# Patient Record
Sex: MALE | Race: ASIAN | Employment: FULL TIME | URBAN - METROPOLITAN AREA
[De-identification: names, ages, dates, MRNs, and addresses within clinical notes are randomized per-mention and may not be internally consistent; named-entity substitution may affect disease eponyms.]

---

## 2018-05-30 ENCOUNTER — OFFICE VISIT (OUTPATIENT)
Dept: FAMILY MEDICINE CLINIC | Facility: CLINIC | Age: 33
End: 2018-05-30

## 2018-05-30 VITALS
HEART RATE: 80 BPM | SYSTOLIC BLOOD PRESSURE: 132 MMHG | WEIGHT: 212 LBS | DIASTOLIC BLOOD PRESSURE: 76 MMHG | BODY MASS INDEX: 30.35 KG/M2 | HEIGHT: 70 IN

## 2018-05-30 DIAGNOSIS — N48.1 BALANITIS: ICD-10-CM

## 2018-05-30 DIAGNOSIS — B08.1 MOLLUSCUM CONTAGIOSUM: ICD-10-CM

## 2018-05-30 DIAGNOSIS — K51.919 ULCERATIVE COLITIS WITH COMPLICATION, UNSPECIFIED LOCATION (HCC): ICD-10-CM

## 2018-05-30 DIAGNOSIS — Z00.00 ROUTINE PHYSICAL EXAMINATION: Primary | ICD-10-CM

## 2018-05-30 PROCEDURE — 99395 PREV VISIT EST AGE 18-39: CPT | Performed by: FAMILY MEDICINE

## 2018-05-30 RX ORDER — PODOFILOX 5 MG/ML
SOLUTION TOPICAL
Qty: 1 BOTTLE | Refills: 0 | Status: SHIPPED | OUTPATIENT
Start: 2018-05-30 | End: 2018-06-05

## 2018-05-30 NOTE — PROGRESS NOTES
Blood pressure 132/76, pulse 80, height 5' 10\" (1.778 m), weight 212 lb (96.2 kg). REASON FOR VISIT:    Jaclyn Curtis is a 29year old male who presents for an [address]         Patient Active Problem List:     Routine physical examination SPECIFIC DISEASE MONITORING Internal Lab or Procedure   No disease specific diagnoses    ALLERGIES:   No Known Allergies  CURRENT MEDICATIONS:     No current outpatient prescriptions on file.    MEDICAL INFORMATION:   Past Medical History:   Diagnosis hernia, PAPULAR UMBILICATED lesions ERYTHEMA ON SHAFT   RECTAL: deferred  MUSCULOSKELETAL: back is not tender, FROM of the back  EXTREMITIES: no cyanosis, clubbing or edema  NEURO: Oriented times three, cranial nerves are intact, motor and sensory are hipolito

## 2018-06-01 ENCOUNTER — APPOINTMENT (OUTPATIENT)
Dept: LAB | Age: 33
End: 2018-06-01
Attending: FAMILY MEDICINE
Payer: COMMERCIAL

## 2018-06-01 DIAGNOSIS — B08.1 MOLLUSCUM CONTAGIOSUM: ICD-10-CM

## 2018-06-01 DIAGNOSIS — Z00.00 ROUTINE PHYSICAL EXAMINATION: ICD-10-CM

## 2018-06-01 PROCEDURE — 80500 HEPATITIS A B + C PROFILE: CPT

## 2018-06-01 PROCEDURE — 86780 TREPONEMA PALLIDUM: CPT

## 2018-06-01 PROCEDURE — 80061 LIPID PANEL: CPT

## 2018-06-01 PROCEDURE — 86803 HEPATITIS C AB TEST: CPT

## 2018-06-01 PROCEDURE — 87340 HEPATITIS B SURFACE AG IA: CPT

## 2018-06-01 PROCEDURE — 86708 HEPATITIS A ANTIBODY: CPT

## 2018-06-01 PROCEDURE — 87389 HIV-1 AG W/HIV-1&-2 AB AG IA: CPT

## 2018-06-01 PROCEDURE — 82947 ASSAY GLUCOSE BLOOD QUANT: CPT

## 2018-06-01 PROCEDURE — 86706 HEP B SURFACE ANTIBODY: CPT

## 2018-06-01 PROCEDURE — 86704 HEP B CORE ANTIBODY TOTAL: CPT

## 2018-06-01 PROCEDURE — 87591 N.GONORRHOEAE DNA AMP PROB: CPT

## 2018-06-01 PROCEDURE — 87491 CHLMYD TRACH DNA AMP PROBE: CPT

## 2018-06-01 PROCEDURE — 36415 COLL VENOUS BLD VENIPUNCTURE: CPT

## 2018-06-02 ENCOUNTER — PATIENT MESSAGE (OUTPATIENT)
Dept: FAMILY MEDICINE CLINIC | Facility: CLINIC | Age: 33
End: 2018-06-02

## 2018-06-05 RX ORDER — PODOFILOX 5 MG/ML
SOLUTION TOPICAL
Qty: 1 BOTTLE | Refills: 0 | Status: SHIPPED | OUTPATIENT
Start: 2018-06-05 | End: 2019-05-02 | Stop reason: ALTCHOICE

## 2018-06-05 NOTE — TELEPHONE ENCOUNTER
From: Alcira Medina  To: Radha Norton DO  Sent: 6/2/2018 9:47 PM CDT  Subject: Prescription Question    Hi. Can my prescription for podofolix be sent to the [facility] in [facility] on [address]? Their phone number is [phone].     Than

## 2018-06-05 NOTE — TELEPHONE ENCOUNTER
Called [facility] and cancelled prescription for Podofilox Soln. Re-sent to [facility]. E-mail sent to patient.

## 2018-10-19 ENCOUNTER — OFFICE VISIT (OUTPATIENT)
Dept: FAMILY MEDICINE CLINIC | Facility: CLINIC | Age: 33
End: 2018-10-19
Payer: COMMERCIAL

## 2018-10-19 VITALS
HEIGHT: 70 IN | BODY MASS INDEX: 31.78 KG/M2 | DIASTOLIC BLOOD PRESSURE: 96 MMHG | WEIGHT: 222 LBS | HEART RATE: 130 BPM | SYSTOLIC BLOOD PRESSURE: 138 MMHG

## 2018-10-19 DIAGNOSIS — R06.83 SNORING: ICD-10-CM

## 2018-10-19 DIAGNOSIS — G47.33 OSA (OBSTRUCTIVE SLEEP APNEA): Primary | ICD-10-CM

## 2018-10-19 PROCEDURE — 99213 OFFICE O/P EST LOW 20 MIN: CPT | Performed by: FAMILY MEDICINE

## 2018-10-19 PROCEDURE — 99212 OFFICE O/P EST SF 10 MIN: CPT | Performed by: FAMILY MEDICINE

## 2018-10-19 NOTE — PROGRESS NOTES
Blood pressure (!) 138/96, pulse (!) 130, height 5' 10\" (1.778 m), weight 222 lb (100.7 kg). PATIENT PRESENT TODAY CONCERNED ABOUT SLEEP APNEA. Was told that he snores very loudly. Girlfriend told him he stops breathing while he sleeps.   He does admi

## 2018-10-19 NOTE — PATIENT INSTRUCTIONS
What Are Snoring and Obstructive Sleep Apnea? If Chikis Ly ever had a stuffed-up nose, you know the feeling of trying to breathe through a very narrow passageway. This is what happens in your throat when you snore.  While you sleep, structures in your throa Problems in the structure of the nose may block breathing. A crooked (deviated) septum or swollen turbinates can make snoring worse or lead to apnea. Also, a receding jaw may make the tongue sit too far back.  Then it’s more likely to block the airway when · If you smoke, talk to your healthcare provider about ways to quit. · Sleep on your side. This can help prevent gravity from pulling relaxed throat tissues into your breathing passages.   · If you have allergies or sinus problems that block your nose, ask

## 2018-11-09 ENCOUNTER — TELEPHONE (OUTPATIENT)
Dept: FAMILY MEDICINE CLINIC | Facility: CLINIC | Age: 33
End: 2018-11-09

## 2018-11-09 ENCOUNTER — OFFICE VISIT (OUTPATIENT)
Dept: OTOLARYNGOLOGY | Facility: CLINIC | Age: 33
End: 2018-11-09
Payer: COMMERCIAL

## 2018-11-09 VITALS
HEIGHT: 70 IN | DIASTOLIC BLOOD PRESSURE: 87 MMHG | TEMPERATURE: 99 F | WEIGHT: 220 LBS | BODY MASS INDEX: 31.5 KG/M2 | SYSTOLIC BLOOD PRESSURE: 138 MMHG

## 2018-11-09 DIAGNOSIS — G47.33 OBSTRUCTIVE SLEEP APNEA SYNDROME: Primary | ICD-10-CM

## 2018-11-09 DIAGNOSIS — G47.33 OSA (OBSTRUCTIVE SLEEP APNEA): Primary | ICD-10-CM

## 2018-11-09 PROCEDURE — 99243 OFF/OP CNSLTJ NEW/EST LOW 30: CPT | Performed by: OTOLARYNGOLOGY

## 2018-11-09 PROCEDURE — 99212 OFFICE O/P EST SF 10 MIN: CPT | Performed by: OTOLARYNGOLOGY

## 2018-11-09 NOTE — PROGRESS NOTES
Mukesh Martinez is a 34year old male. Patient presents with:  Snoring    HPI:   He has been having very loud snoring for many years. He has been told that his snoring is getting worse. He feels that he is sleeping very poorly.     Current Outpatient Medica Normal.    Ears Normal Inspection - Right: Normal, Left: Normal. Canal - Left: Normal. TM - Right: Normal, Left: Normal.     ASSESSMENT AND PLAN:   1.  Obstructive sleep apnea syndrome  He has a nasal septal deviation and very large tonsils causing nasal an

## 2018-11-12 ENCOUNTER — TELEPHONE (OUTPATIENT)
Dept: OTOLARYNGOLOGY | Facility: CLINIC | Age: 33
End: 2018-11-12

## 2018-11-12 NOTE — TELEPHONE ENCOUNTER
Authorization is pending for pt. Pending [id number]. Faxed over notes to [facility] today.

## 2018-11-13 ENCOUNTER — TELEPHONE (OUTPATIENT)
Dept: OTOLARYNGOLOGY | Facility: CLINIC | Age: 33
End: 2018-11-13

## 2018-11-13 NOTE — TELEPHONE ENCOUNTER
Patient dropped of a form from his insurance stating that it needed to be filled out to show when he had his physical done.   Form was filled out and message was left on patient's voicemail that the form will be at the  for him to .    Patient states he sent a message through [employer] to , was made aware out of office until 18. Would like to see if RN call call him back regarding his [employer] message. Please call. Thank you.

## 2018-11-13 NOTE — TELEPHONE ENCOUNTER
Called pt's insurance, [phone], spoke with Phillip, case for diagnostic sleep study is still pending clinical review.

## 2018-11-14 NOTE — TELEPHONE ENCOUNTER
[facility] called requesting any office visit notes be sent to fax # [phone].

## 2018-11-28 ENCOUNTER — TELEPHONE (OUTPATIENT)
Dept: OTOLARYNGOLOGY | Facility: CLINIC | Age: 33
End: 2018-11-28

## 2018-11-28 NOTE — TELEPHONE ENCOUNTER
MARC regarding approved prior authorization for sleep study with authorization [id number]  Valid from November 14,2018 until February 10,2019, please advise pt to call his insurance to check his out of the pocket expenses and co pays.

## 2018-11-29 ENCOUNTER — TELEPHONE (OUTPATIENT)
Dept: OTOLARYNGOLOGY | Facility: CLINIC | Age: 33
End: 2018-11-29

## 2019-01-10 ENCOUNTER — TELEPHONE (OUTPATIENT)
Dept: OTOLARYNGOLOGY | Facility: CLINIC | Age: 34
End: 2019-01-10

## 2019-01-10 RX ORDER — AMOXICILLIN AND CLAVULANATE POTASSIUM 250; 62.5 MG/5ML; MG/5ML
15 POWDER, FOR SUSPENSION ORAL 2 TIMES DAILY
Qty: 210 ML | Refills: 0 | Status: SHIPPED | OUTPATIENT
Start: 2019-01-10 | End: 2019-01-17

## 2019-01-10 RX ORDER — PREDNISOLONE 15 MG/5 ML
15 SOLUTION, ORAL ORAL 2 TIMES DAILY
Qty: 70 ML | Refills: 0 | Status: SHIPPED | OUTPATIENT
Start: 2019-01-10 | End: 2019-01-17

## 2019-01-11 ENCOUNTER — TELEPHONE (OUTPATIENT)
Dept: OTOLARYNGOLOGY | Facility: CLINIC | Age: 34
End: 2019-01-11

## 2019-01-11 NOTE — TELEPHONE ENCOUNTER
Pt is post op day 1 tonsillectomy/septoplasty/smr. Per pt  is doing ok, pt c/o of pain , drinking plenty of fluids, no bleeding or fever.  Advised pt pain can worsen post op and radiate to jaw, ears, and is not abnormal, pt is taking pain medication,antibio

## 2019-01-16 ENCOUNTER — TELEPHONE (OUTPATIENT)
Dept: OTOLARYNGOLOGY | Facility: CLINIC | Age: 34
End: 2019-01-16

## 2019-01-16 NOTE — TELEPHONE ENCOUNTER
Per pt no bleeding or fever, per pt having a lot of throat pain. Advised pt that with tonsillectomy, pain tends to worsen post op days 4-7 and is expected.  Pt advised to push fluids, Dr. Olga Marie informed and advised no other recommendations or medications w

## 2019-01-16 NOTE — TELEPHONE ENCOUNTER
Pt. States that he just had surgery last week and is concerned about having alot pain in his throat. I transferred call to RN.

## 2019-01-17 ENCOUNTER — TELEPHONE (OUTPATIENT)
Dept: ADMINISTRATIVE | Age: 34
End: 2019-01-17

## 2019-01-17 NOTE — TELEPHONE ENCOUNTER
Pt will have FMLA forms faxed to forms dept. Pt to fill out HIPAA release and pay $25 at time of visit on 1/18/19.

## 2019-01-18 ENCOUNTER — OFFICE VISIT (OUTPATIENT)
Dept: OTOLARYNGOLOGY | Facility: CLINIC | Age: 34
End: 2019-01-18
Payer: COMMERCIAL

## 2019-01-18 VITALS
SYSTOLIC BLOOD PRESSURE: 124 MMHG | DIASTOLIC BLOOD PRESSURE: 90 MMHG | BODY MASS INDEX: 31.5 KG/M2 | WEIGHT: 220 LBS | TEMPERATURE: 97 F | HEIGHT: 70 IN

## 2019-01-18 DIAGNOSIS — G47.33 OBSTRUCTIVE SLEEP APNEA SYNDROME: Primary | ICD-10-CM

## 2019-01-18 PROCEDURE — 99212 OFFICE O/P EST SF 10 MIN: CPT | Performed by: OTOLARYNGOLOGY

## 2019-01-18 PROCEDURE — 99024 POSTOP FOLLOW-UP VISIT: CPT | Performed by: OTOLARYNGOLOGY

## 2019-01-18 NOTE — TELEPHONE ENCOUNTER
Pt here for Post op appt with     PT given HIPAA release and FCR forms to fill out and complete    Pt paid $25 fee    Pt asking if we have received FMLA forms from his employer yet    Pt will email them to us now/ email received and printed out    Pt wi

## 2019-01-18 NOTE — PROGRESS NOTES
He is still very sore following his septoplasty and tonsillectomy. Exam:  Nasal splints removed. Airway patent bilaterally. Pharynx is healing well    Assessment/plan:  He actually seems to be doing very well following his surgery.   I recommended that

## 2019-01-19 NOTE — TELEPHONE ENCOUNTER
FMLA and Disability forms for Dr. Zully Sosa received in [employer] Signed release, paid $25 w/ . Logged for processing. Need to inform pt of addl $25 charge (2 forms).  NK

## 2019-02-01 NOTE — TELEPHONE ENCOUNTER
Dr. Marely Flores,  2 forms: FMLA and Disab - Pt off from 1/10 and RTW on 1/23/19 due to procedure. Please sign off on form:  -Highlight the patient and hit \"Chart\" button.   -In Chart Review, w/in the Encounter tab - click 1 time on the Telephone call encount

## 2019-02-06 ENCOUNTER — OFFICE VISIT (OUTPATIENT)
Dept: FAMILY MEDICINE CLINIC | Facility: CLINIC | Age: 34
End: 2019-02-06
Payer: COMMERCIAL

## 2019-02-06 VITALS
SYSTOLIC BLOOD PRESSURE: 124 MMHG | HEART RATE: 111 BPM | HEIGHT: 70 IN | BODY MASS INDEX: 31.41 KG/M2 | WEIGHT: 219.38 LBS | DIASTOLIC BLOOD PRESSURE: 85 MMHG

## 2019-02-06 DIAGNOSIS — L02.512 ABSCESS OF FINGER OF LEFT HAND: Primary | ICD-10-CM

## 2019-02-06 PROCEDURE — 99212 OFFICE O/P EST SF 10 MIN: CPT | Performed by: FAMILY MEDICINE

## 2019-02-06 PROCEDURE — 90471 IMMUNIZATION ADMIN: CPT | Performed by: FAMILY MEDICINE

## 2019-02-06 PROCEDURE — 90715 TDAP VACCINE 7 YRS/> IM: CPT | Performed by: FAMILY MEDICINE

## 2019-02-06 PROCEDURE — 99213 OFFICE O/P EST LOW 20 MIN: CPT | Performed by: FAMILY MEDICINE

## 2019-02-06 RX ORDER — CEPHALEXIN 500 MG/1
500 TABLET ORAL 4 TIMES DAILY
Qty: 40 TABLET | Refills: 0 | Status: SHIPPED | OUTPATIENT
Start: 2019-02-06 | End: 2019-05-02 | Stop reason: ALTCHOICE

## 2019-02-06 NOTE — PROGRESS NOTES
Blood pressure 124/85, pulse 111, height 5' 10\" (1.778 m), weight 219 lb 6 oz (99.5 kg). Patient presents today complaining of persistent left index finger pain at the tip after pressing into a tack at work.   He reports it is not healed and is painful

## 2019-02-11 ENCOUNTER — TELEPHONE (OUTPATIENT)
Dept: FAMILY MEDICINE CLINIC | Facility: CLINIC | Age: 34
End: 2019-02-11

## 2019-02-11 NOTE — TELEPHONE ENCOUNTER
----- Message from Michelle Jiménez DO sent at 2/8/2019 12:26 PM CST -----  No growth from finger culture so far. Please advise patient to continue to place neosporin on finger and change dressing daily.

## 2019-02-13 ENCOUNTER — OFFICE VISIT (OUTPATIENT)
Dept: FAMILY MEDICINE CLINIC | Facility: CLINIC | Age: 34
End: 2019-02-13
Payer: COMMERCIAL

## 2019-02-13 VITALS
WEIGHT: 218 LBS | BODY MASS INDEX: 31.21 KG/M2 | SYSTOLIC BLOOD PRESSURE: 122 MMHG | DIASTOLIC BLOOD PRESSURE: 84 MMHG | HEIGHT: 70 IN | HEART RATE: 116 BPM

## 2019-02-13 DIAGNOSIS — L02.512 ABSCESS OF FINGER OF LEFT HAND: Primary | ICD-10-CM

## 2019-02-13 PROCEDURE — 99213 OFFICE O/P EST LOW 20 MIN: CPT | Performed by: FAMILY MEDICINE

## 2019-02-13 PROCEDURE — 99212 OFFICE O/P EST SF 10 MIN: CPT | Performed by: FAMILY MEDICINE

## 2019-02-13 NOTE — PROGRESS NOTES
Blood pressure 122/84, pulse 116, height 5' 10\" (1.778 m), weight 218 lb (98.9 kg). Following up for left index finger trauma. Reports that he has noticed decreased pain. No systemic symptoms. Taking cephalexin and using Neosporin on the finger.

## 2019-03-19 ENCOUNTER — OFFICE VISIT (OUTPATIENT)
Dept: FAMILY MEDICINE CLINIC | Facility: CLINIC | Age: 34
End: 2019-03-19
Payer: COMMERCIAL

## 2019-03-19 VITALS
HEIGHT: 70 IN | DIASTOLIC BLOOD PRESSURE: 86 MMHG | SYSTOLIC BLOOD PRESSURE: 125 MMHG | BODY MASS INDEX: 32.21 KG/M2 | WEIGHT: 225 LBS | HEART RATE: 99 BPM

## 2019-03-19 DIAGNOSIS — S69.92XD INJURY OF LEFT INDEX FINGER, SUBSEQUENT ENCOUNTER: Primary | ICD-10-CM

## 2019-03-19 PROCEDURE — 99213 OFFICE O/P EST LOW 20 MIN: CPT | Performed by: FAMILY MEDICINE

## 2019-03-19 PROCEDURE — 99212 OFFICE O/P EST SF 10 MIN: CPT | Performed by: FAMILY MEDICINE

## 2019-03-19 NOTE — PROGRESS NOTES
Blood pressure 125/86, pulse 99, height 5' 10\" (1.778 m), weight 225 lb (102.1 kg). Continuous pain of left fingertip of the index. Reports that he does not have pain further down only in the tip of his finger. 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   He is

## 2019-04-01 ENCOUNTER — OFFICE VISIT (OUTPATIENT)
Dept: SURGERY | Facility: CLINIC | Age: 34
End: 2019-04-01
Payer: COMMERCIAL

## 2019-04-01 DIAGNOSIS — L72.0 INCLUSION CYST: ICD-10-CM

## 2019-04-01 DIAGNOSIS — S61.201A UNSPECIFIED OPEN WOUND OF LEFT INDEX FINGER WITHOUT DAMAGE TO NAIL, INITIAL ENCOUNTER: Primary | ICD-10-CM

## 2019-04-01 PROCEDURE — 99243 OFF/OP CNSLTJ NEW/EST LOW 30: CPT | Performed by: PLASTIC SURGERY

## 2019-04-01 PROCEDURE — 99212 OFFICE O/P EST SF 10 MIN: CPT | Performed by: PLASTIC SURGERY

## 2019-04-01 NOTE — PROGRESS NOTES
Per Dr. Patrick Hartman' evaluation, applied silvadene and a Band-Aid to LIF and pt will follow-up in one month. Verbalized understanding.

## 2019-04-01 NOTE — H&P
Shine Cerda is a 34year old male that presents with Patient presents with: Infection: LIF  .     REFERRED BY:  Daniella Rodriguez      Pacemaker: No  Latex Allergy: no  Coumadin: No  Plavix: No  Other anticoagulants: No  Cardiac stents: No    HAND [facility] Medications:  Cephalexin 500 MG Oral Tab Take 500 mg by mouth 4 (four) times daily. Disp: 40 tablet Rfl: 0   HYDROcodone-acetaminophen 7.5-325 MG/15ML Oral Solution Take 15 mL by mouth every 6 (six) hours as needed for Pain.  Disp: 473 mL Rfl: 0   Podofilox file        Physically abused: Not on file        Forced sexual activity: Not on file    Other Topics      Concerns:         Service: Not Asked        Blood Transfusions: Not Asked        Caffeine Concern: Not Asked          soda, biweekly        O cool    Excellent blanching and capillary refill    Wrist Rai: Radial 3 seconds, ulnar 3 seconds    Digital Ari: RDA 5, UDA 5 seconds      Left buttock 1 cm inclusion cyst noninfected      ASSESSMENT/PLAN:       WOUND(S) LIF, traumatic, nonhealing  Per

## 2019-04-19 ENCOUNTER — OFFICE VISIT (OUTPATIENT)
Dept: OTOLARYNGOLOGY | Facility: CLINIC | Age: 34
End: 2019-04-19
Payer: COMMERCIAL

## 2019-04-19 VITALS — HEART RATE: 94 BPM | SYSTOLIC BLOOD PRESSURE: 127 MMHG | DIASTOLIC BLOOD PRESSURE: 86 MMHG

## 2019-04-19 DIAGNOSIS — G47.33 OBSTRUCTIVE SLEEP APNEA SYNDROME: Primary | ICD-10-CM

## 2019-04-19 PROCEDURE — 99213 OFFICE O/P EST LOW 20 MIN: CPT | Performed by: OTOLARYNGOLOGY

## 2019-04-19 PROCEDURE — 99212 OFFICE O/P EST SF 10 MIN: CPT | Performed by: OTOLARYNGOLOGY

## 2019-04-19 NOTE — PROGRESS NOTES
Karen Jain is a 34year old male. Patient presents with:  Post-Op: Patient present for surgical follow up - more mucus since surgery    HPI:   Feels that has been doing really well following his surgery. He is sleeping much better.   He has a little bit Psychiatric Normal Orientation - Oriented to time, place, person & situation. Appropriate mood and affect. Lymph Detail Normal Submental. Submandibular. Anterior cervical. Posterior cervical. Supraclavicular.    Eyes Normal Conjunctiva - Right: Normal,

## 2019-05-02 ENCOUNTER — OFFICE VISIT (OUTPATIENT)
Dept: SURGERY | Facility: CLINIC | Age: 34
End: 2019-05-02
Payer: COMMERCIAL

## 2019-05-02 DIAGNOSIS — S61.201A UNSPECIFIED OPEN WOUND OF LEFT INDEX FINGER WITHOUT DAMAGE TO NAIL, INITIAL ENCOUNTER: Primary | ICD-10-CM

## 2019-05-02 PROCEDURE — 99212 OFFICE O/P EST SF 10 MIN: CPT | Performed by: PLASTIC SURGERY

## 2019-05-02 NOTE — PROGRESS NOTES
Injury 1: LIF wound - tack  - Date: 01/08/19  - Days Since: 114    LIF open to air   Pt has been washing, applying silvadene, and band aid twice a day  Distal tip of LIF has small superficial yellow to brown colored scab  No drainage   Periwound area with

## 2019-06-20 ENCOUNTER — TELEPHONE (OUTPATIENT)
Dept: FAMILY MEDICINE CLINIC | Facility: CLINIC | Age: 34
End: 2019-06-20

## 2019-06-20 ENCOUNTER — OFFICE VISIT (OUTPATIENT)
Dept: SURGERY | Facility: CLINIC | Age: 34
End: 2019-06-20
Payer: COMMERCIAL

## 2019-06-20 DIAGNOSIS — S61.201A UNSPECIFIED OPEN WOUND OF LEFT INDEX FINGER WITHOUT DAMAGE TO NAIL, INITIAL ENCOUNTER: Primary | ICD-10-CM

## 2019-06-20 DIAGNOSIS — M79.643 PAIN OF HAND, UNSPECIFIED LATERALITY: ICD-10-CM

## 2019-06-20 PROCEDURE — 99213 OFFICE O/P EST LOW 20 MIN: CPT | Performed by: PLASTIC SURGERY

## 2019-06-20 PROCEDURE — 99212 OFFICE O/P EST SF 10 MIN: CPT | Performed by: PLASTIC SURGERY

## 2019-06-20 NOTE — PROGRESS NOTES
Injury 1: LIF wound - tack  - Date: 01/08/19  - Days Since: 163      Pt here for FU of LIF wound   Denies pain, numbness and tingling to LIF  LIF distal tip has minimal scabbing to site and is healing, approximated, no drainage or s/s of infection   Pt is

## 2019-06-20 NOTE — PROGRESS NOTES
Per verbal  order from Dr Sejal Horner, pt  given verbal instructions with demonstration of Eucerin massage to incision of LIF  Given \"After Skin Surgery\" Pamphlet. Discussed sun exposure, sun block usage and scars sensitivity to the sun.   Questions answe

## 2021-04-08 ENCOUNTER — HOSPITAL ENCOUNTER (INPATIENT)
Facility: HOSPITAL | Age: 36
LOS: 2 days | Discharge: HOME | DRG: 177 | End: 2021-04-10
Attending: EMERGENCY MEDICINE | Admitting: HOSPITALIST
Payer: COMMERCIAL

## 2021-04-08 ENCOUNTER — APPOINTMENT (OUTPATIENT)
Dept: GENERAL RADIOLOGY | Facility: HOSPITAL | Age: 36
DRG: 177 | End: 2021-04-08
Payer: COMMERCIAL

## 2021-04-08 ENCOUNTER — APPOINTMENT (OUTPATIENT)
Dept: CT IMAGING | Facility: HOSPITAL | Age: 36
DRG: 177 | End: 2021-04-08
Attending: HOSPITALIST
Payer: COMMERCIAL

## 2021-04-08 DIAGNOSIS — J12.82 PNEUMONIA DUE TO COVID-19 VIRUS: Primary | ICD-10-CM

## 2021-04-08 DIAGNOSIS — R09.02 HYPOXIA: ICD-10-CM

## 2021-04-08 DIAGNOSIS — U07.1 PNEUMONIA DUE TO COVID-19 VIRUS: Primary | ICD-10-CM

## 2021-04-08 PROCEDURE — 86140 C-REACTIVE PROTEIN: CPT | Performed by: EMERGENCY MEDICINE

## 2021-04-08 PROCEDURE — 99285 EMERGENCY DEPT VISIT HI MDM: CPT

## 2021-04-08 PROCEDURE — 84484 ASSAY OF TROPONIN QUANT: CPT | Performed by: EMERGENCY MEDICINE

## 2021-04-08 PROCEDURE — 93010 ELECTROCARDIOGRAM REPORT: CPT

## 2021-04-08 PROCEDURE — 82550 ASSAY OF CK (CPK): CPT | Performed by: HOSPITALIST

## 2021-04-08 PROCEDURE — 96365 THER/PROPH/DIAG IV INF INIT: CPT

## 2021-04-08 PROCEDURE — 71045 X-RAY EXAM CHEST 1 VIEW: CPT | Performed by: EMERGENCY MEDICINE

## 2021-04-08 PROCEDURE — XW033E5 INTRODUCTION OF REMDESIVIR ANTI-INFECTIVE INTO PERIPHERAL VEIN, PERCUTANEOUS APPROACH, NEW TECHNOLOGY GROUP 5: ICD-10-PCS | Performed by: HOSPITALIST

## 2021-04-08 PROCEDURE — 80053 COMPREHEN METABOLIC PANEL: CPT | Performed by: EMERGENCY MEDICINE

## 2021-04-08 PROCEDURE — 85379 FIBRIN DEGRADATION QUANT: CPT | Performed by: EMERGENCY MEDICINE

## 2021-04-08 PROCEDURE — 84145 PROCALCITONIN (PCT): CPT | Performed by: HOSPITALIST

## 2021-04-08 PROCEDURE — 3E0333Z INTRODUCTION OF ANTI-INFLAMMATORY INTO PERIPHERAL VEIN, PERCUTANEOUS APPROACH: ICD-10-PCS | Performed by: HOSPITALIST

## 2021-04-08 PROCEDURE — 82728 ASSAY OF FERRITIN: CPT | Performed by: HOSPITALIST

## 2021-04-08 PROCEDURE — 84145 PROCALCITONIN (PCT): CPT | Performed by: EMERGENCY MEDICINE

## 2021-04-08 PROCEDURE — 82550 ASSAY OF CK (CPK): CPT | Performed by: EMERGENCY MEDICINE

## 2021-04-08 PROCEDURE — 83615 LACTATE (LD) (LDH) ENZYME: CPT | Performed by: HOSPITALIST

## 2021-04-08 PROCEDURE — 85379 FIBRIN DEGRADATION QUANT: CPT | Performed by: HOSPITALIST

## 2021-04-08 PROCEDURE — 82728 ASSAY OF FERRITIN: CPT | Performed by: EMERGENCY MEDICINE

## 2021-04-08 PROCEDURE — 80053 COMPREHEN METABOLIC PANEL: CPT | Performed by: INTERNAL MEDICINE

## 2021-04-08 PROCEDURE — 83615 LACTATE (LD) (LDH) ENZYME: CPT | Performed by: EMERGENCY MEDICINE

## 2021-04-08 PROCEDURE — 71275 CT ANGIOGRAPHY CHEST: CPT | Performed by: HOSPITALIST

## 2021-04-08 PROCEDURE — 87040 BLOOD CULTURE FOR BACTERIA: CPT | Performed by: EMERGENCY MEDICINE

## 2021-04-08 PROCEDURE — 93005 ELECTROCARDIOGRAM TRACING: CPT

## 2021-04-08 PROCEDURE — 83880 ASSAY OF NATRIURETIC PEPTIDE: CPT | Performed by: EMERGENCY MEDICINE

## 2021-04-08 PROCEDURE — 85025 COMPLETE CBC W/AUTO DIFF WBC: CPT | Performed by: HOSPITALIST

## 2021-04-08 PROCEDURE — 86140 C-REACTIVE PROTEIN: CPT | Performed by: HOSPITALIST

## 2021-04-08 PROCEDURE — 36415 COLL VENOUS BLD VENIPUNCTURE: CPT

## 2021-04-08 PROCEDURE — 85025 COMPLETE CBC W/AUTO DIFF WBC: CPT | Performed by: EMERGENCY MEDICINE

## 2021-04-08 RX ORDER — BISACODYL 10 MG
10 SUPPOSITORY, RECTAL RECTAL
Status: DISCONTINUED | OUTPATIENT
Start: 2021-04-08 | End: 2021-04-10

## 2021-04-08 RX ORDER — ACETAMINOPHEN 500 MG
1000 TABLET ORAL ONCE
Status: DISCONTINUED | OUTPATIENT
Start: 2021-04-08 | End: 2021-04-08

## 2021-04-08 RX ORDER — ACETAMINOPHEN 500 MG
500 TABLET ORAL EVERY 6 HOURS PRN
COMMUNITY
End: 2021-08-24 | Stop reason: ALTCHOICE

## 2021-04-08 RX ORDER — BENZONATATE 100 MG/1
100 CAPSULE ORAL 3 TIMES DAILY PRN
Status: DISCONTINUED | OUTPATIENT
Start: 2021-04-08 | End: 2021-04-10

## 2021-04-08 RX ORDER — ACETAMINOPHEN 500 MG
1000 TABLET ORAL ONCE
Status: COMPLETED | OUTPATIENT
Start: 2021-04-08 | End: 2021-04-08

## 2021-04-08 RX ORDER — POLYETHYLENE GLYCOL 3350 17 G/17G
17 POWDER, FOR SOLUTION ORAL DAILY PRN
Status: DISCONTINUED | OUTPATIENT
Start: 2021-04-08 | End: 2021-04-10

## 2021-04-08 RX ORDER — ONDANSETRON 2 MG/ML
4 INJECTION INTRAMUSCULAR; INTRAVENOUS EVERY 6 HOURS PRN
Status: DISCONTINUED | OUTPATIENT
Start: 2021-04-08 | End: 2021-04-10

## 2021-04-08 RX ORDER — CODEINE PHOSPHATE AND GUAIFENESIN 10; 100 MG/5ML; MG/5ML
5 SOLUTION ORAL EVERY 6 HOURS PRN
Status: DISCONTINUED | OUTPATIENT
Start: 2021-04-08 | End: 2021-04-10

## 2021-04-08 RX ORDER — DIPHENHYDRAMINE HCL 25 MG
25 CAPSULE ORAL EVERY 6 HOURS PRN
COMMUNITY
End: 2021-08-24 | Stop reason: ALTCHOICE

## 2021-04-08 RX ORDER — ENOXAPARIN SODIUM 100 MG/ML
40 INJECTION SUBCUTANEOUS DAILY
Status: DISCONTINUED | OUTPATIENT
Start: 2021-04-08 | End: 2021-04-10

## 2021-04-08 RX ORDER — ACETAMINOPHEN 325 MG/1
650 TABLET ORAL EVERY 6 HOURS PRN
Status: DISCONTINUED | OUTPATIENT
Start: 2021-04-08 | End: 2021-04-10

## 2021-04-08 RX ORDER — DEXAMETHASONE SODIUM PHOSPHATE 4 MG/ML
6 VIAL (ML) INJECTION EVERY 24 HOURS
Status: DISCONTINUED | OUTPATIENT
Start: 2021-04-09 | End: 2021-04-10

## 2021-04-08 RX ORDER — DEXAMETHASONE 4 MG/1
6 TABLET ORAL ONCE
Status: COMPLETED | OUTPATIENT
Start: 2021-04-08 | End: 2021-04-08

## 2021-04-08 RX ORDER — GUAIFENESIN 100 MG/5ML
100 SOLUTION ORAL EVERY 4 HOURS PRN
COMMUNITY
End: 2021-08-24 | Stop reason: ALTCHOICE

## 2021-04-08 RX ORDER — GUAIFENESIN 600 MG
600 TABLET, EXTENDED RELEASE 12 HR ORAL 2 TIMES DAILY
COMMUNITY
End: 2021-08-24 | Stop reason: ALTCHOICE

## 2021-04-08 RX ORDER — MELATONIN
3 NIGHTLY PRN
Status: DISCONTINUED | OUTPATIENT
Start: 2021-04-08 | End: 2021-04-10

## 2021-04-08 NOTE — PLAN OF CARE
Problem: Patient/Family Goals  Goal: Patient/Family Long Term Goal  Description: Patient's Long Term Goal:    Interventions:  -   - See additional Care Plan goals for specific interventions  Outcome: Progressing  Goal: Patient/Family Short Term Goal  Tarik consult as needed  - Establish a toileting routine/schedule  - Consider collaborating with pharmacy to review patient's medication profile  Outcome: Progressing     Problem: RISK FOR INFECTION - ADULT  Goal: Absence of fever/infection during anticipated ne

## 2021-04-08 NOTE — CONSULTS
Pulmonary Consult     Assessment / Plan:  1. Acute hypoxemic respiratory failure  - Due to COVID-19 pneumonia   - on vapotherm 40L/70%  - tx as discussed below   - IS use  - had an extensive conversation re: awake prone    2.  COVID-19 pneumonia   - Sx 3/29 Tablet 12 Hr, Take 600 mg by mouth 2 (two) times daily. , Disp: , Rfl: , Taking      guaiFENesin 100 MG/5ML Oral Solution, Take 100 mg by mouth every 4 (four) hours as needed. , Disp: , Rfl:   diphenhydrAMINE 25 MG Oral Cap, Take 25 mg by mouth every 6 (six) EMR    Imaging:   Chest imaging reviewed

## 2021-04-08 NOTE — CONSULTS
INFECTIOUS DISEASE [address] [facility] [address] Patient Status:  Inpatient    10/10/1985 MRN [id number]   [facility] 5NW-A Attending Jonathan Fong MD   Hosp Day # 0 PCP Estefani Tobias Oral, Daily PRN  •  bisacodyl (DULCOLAX) rectal suppository 10 mg, 10 mg, Rectal, Daily PRN  •  [START ON 4/9/2021] remdesivir 100 mg in sodium chloride 0.9% 270 mL IVPB, 100 mg, Intravenous, Q24H  •  guaiFENesin (ROBITUSSIN) 100mg/5ml LIQUID 100 mg, 100 m lesions.  No erythema, no open wounds      Laboratory Data:  Laboratory data reviewed      Recent Labs   Lab 04/08/21  0552   RBC 4.89   HGB 14.6   HCT 43.7   MCV 89.4   MCH 29.9   MCHC 33.4   RDW 12.3   NEPRELIM 3.44   WBC 4.8   .0       Recent Labs day  Prophylactic anticoagulation        Wilmar Ramirez MD  [facility]  [phone]

## 2021-04-08 NOTE — ED INITIAL ASSESSMENT (HPI)
Patient here with SOB that started tonight. Patient tested positive for Covid on 4/4/21, symptoms started on 3/31/21. Patient with SPO2 85% on RA upon arrival to A6. Patient placed on 2L O2 via nasal cannula with improved reading 95%.

## 2021-04-08 NOTE — PAYOR COMM NOTE
Appropriate for inpatient status per guidelines for SOB and chest pain due to COVID pneumonia with hypoxia.       --------------  ADMISSION REVIEW     Payor: [address] [place] [address] #:  [id number]  Authorization Number: [id number] membranes are moist.   Eyes:      Extraocular Movements: Extraocular movements intact. Conjunctiva/sclera: Conjunctivae normal.      Comments: Pupils equal, anicteric   Cardiovascular:      Rate and Rhythm: Normal rate and regular rhythm.       Pulses: All other components within normal limits    Narrative:     Resulted by: batch: K, CRP, CK, FERR, LDH, CO2, CL, NA, ALB, TBIL, ALT, AST, ALP, CA, CREA, BUN, GLUC,           Resulted by: [id number]: PBNP,    CK CREATINE KINASE (NOT CREATININE) - Abnormal; Notab components within normal limits   PRO BETA NATRIURETIC PEPTIDE - Normal   TROPONIN I - Normal   CBC WITH DIFFERENTIAL WITH PLATELET    Narrative:        EKG    Rate, intervals and axes as noted on EKG Report.   Rate: 126  Rhythm: sinus tach  Reading: incomp Clinical Impression:  Pneumonia due to COVID-19 virus  (primary encounter diagnosis)  Hypoxia     Disposition:  Admit  4/8/2021  3:51 am                H&P Note    CC: Patient presents with:  Difficulty Breathing        PCP: HENNA Mahajan     Hist day  Prophylactic anticoagulation       ASSESSMENT / PLAN:   36 y/o m w hx of carol, UC p/w sob     Acute hypoxic resp failure d/t covid   -+test 4/4  -40L high flow  -monitor lfts on remdesevir  -decardon  -pulm c/s for actemera  -trend inflammatory markers

## 2021-04-08 NOTE — ED PROVIDER NOTES
Patient Seen in: [facility] Emergency Department      History   Patient presents with:  Difficulty Breathing    Stated Complaint: covid +, holly    HPI/Subjective:   HPI    35yr old M hx ulcerative colitis-not on immunosuppressive meds, here with covi Extraocular Movements: Extraocular movements intact. Conjunctiva/sclera: Conjunctivae normal.      Comments: Pupils equal, anicteric   Cardiovascular:      Rate and Rhythm: Normal rate and regular rhythm. Pulses: Normal pulses.       Heart so normal limits    Narrative:     Resulted by: batch: K, CRP, CK, FERR, LDH, CO2, CL, NA, ALB, TBIL, ALT, AST, ALP, CA, CREA, BUN, GLUC,           Resulted by: [id number]: PBNP,    CK CREATINE KINASE (NOT CREATININE) - Abnormal; Notable for the following compone BETA NATRIURETIC PEPTIDE - Normal   TROPONIN I - Normal   CBC WITH DIFFERENTIAL WITH PLATELET    Narrative: The following orders were created for panel order CBC WITH DIFFERENTIAL WITH PLATELET.   Procedure                               Abnormality 4/8/2021  3:51 AM         Critical Care Statement:   Upon my evaluation, this patient had a high probability of imminent or life-threatening deterioration which required my direct attention, intervention and personal management.  Critical care time is exclu

## 2021-04-08 NOTE — PROGRESS NOTES
Patient arrived from ER to the floor. Patient remains on vapo therm with slight weaning of FiO2. Current settinL /80%. Patient appears without respiratory distress at this time. Will continue to monitor.       21 0616   Oxygen Utilization   $ RT

## 2021-04-08 NOTE — H&P
[facility] Hospitalist H&P       CC: Patient presents with:  Difficulty Breathing       PCP: HENNA Sequeira    History of Present Illness: 36 y/o m w hx of carol, UC p/w sob    PMH  Past Medical History:   Diagnosis Date   • Sleep apnea    • Ulcerative coliti normal. No drainage.    Throat: Lips, mucosa, and tongue normal. Teeth and gums normal.   Neck: Supple, symmetrical, trachea midline, no cervical or supraclavicular lymph adenopathy, thyroid: no enlargment/tenderness/nodules appreciated   Lungs:   Clear to Pancho Austin MD on 4/08/2021 at 7:31 AM           ASSESSMENT / PLAN:   38 y/o m w hx of carol, UC p/w sob    Acute hypoxic resp failure d/t covid   -+test 4/4  -40L high flow  [facility]  -dec[facility]ieron  -pulm c/s for actemera  -trend inflammatory

## 2021-04-08 NOTE — COVID NURSING ASSESSMENT
COVID-19 Daily Discharge Readiness-Nursing    O2 Sat at Rest:   97  % on vapotherm, 40L 80% FiO2     Temperature max from last 24 hrs: Temp (24hrs), Av.1 °F (37.3 °C), Min:98.4 °F (36.9 °C), Max:99.8 °F (37.7 °C)    Inflammatory Markers:   Recent Labs

## 2021-04-08 NOTE — ED QUICK NOTES
Patient states he is feeling some better. His respiratory rate remains elevated high 40's. o2 sat improved with oxygen. Plan of care discussed.

## 2021-04-08 NOTE — COVID NURSING ASSESSMENT
COVID-19 Daily Discharge Readiness-Nursing    O2 Sat at Rest:     %   O2 Sat with Exertion:    % on    liters   Temperature max from last 24 hrs: Temp (24hrs), Av.5 °F (36.9 °C), Min:97.6 °F (36.4 °C), Max:99.8 °F (37.7 °C)    Inflammatory Markers:   R

## 2021-04-08 NOTE — RESPIRATORY THERAPY NOTE
Placed pt on vapotherm due to tachypnea, RR 40's to low 50's. Started on 40L 100%, patient placed in prone position saturating mid 90's, tolerating well. Will wean as tolerated and continue to monitor.

## 2021-04-08 NOTE — DIETARY NOTE
[facility]     Admitting diagnosis:  Pneumonia due to COVID-19 virus [U07.1, J12.82]    Ht: 177.8 cm (5' 10\")  Wt: 104.3 kg (230 lb). Body mass index is 33 kg/m².   IBW: 75.4kg    Labs/Meds reviewed    Diet: Levada Lanes

## 2021-04-08 NOTE — PROGRESS NOTES
NURSING ADMISSION NOTE      Patient admitted via Cart  Oriented to room. Safety precautions initiated. Bed in low position. Call light in reach. Patient alert and oriented x4. Up with standby. Vital signs stable.  Maintaining o2 sats >90% on Vapot

## 2021-04-08 NOTE — PLAN OF CARE
Problem: Patient/Family Goals  Goal: Patient/Family Long Term Goal  Description: Patient's Long Term Goal:    Interventions:  -   - See additional Care Plan goals for specific interventions  Outcome: Progressing  Goal: Patient/Family Short Term Goal  Tarik mobilization and activity  - Obtain nutritional consult as needed  - Establish a toileting routine/schedule  - Consider collaborating with pharmacy to review patient's medication profile  Outcome: Progressing     Problem: RISK FOR INFECTION - ADULT  Goal:

## 2021-04-09 PROCEDURE — 82728 ASSAY OF FERRITIN: CPT | Performed by: HOSPITALIST

## 2021-04-09 PROCEDURE — 80053 COMPREHEN METABOLIC PANEL: CPT | Performed by: INTERNAL MEDICINE

## 2021-04-09 PROCEDURE — 86140 C-REACTIVE PROTEIN: CPT | Performed by: HOSPITALIST

## 2021-04-09 NOTE — PROGRESS NOTES
04/08/21 2121   Provider Notification   Reason for Communication Review case   Provider Name Other (comment)  (Dr. [facility])   Method of Communication Page   Response Waiting for response   Notification Time 2122   508, dry cough with no relief from PRN

## 2021-04-09 NOTE — PROGRESS NOTES
[facility]                INFECTIOUS DISEASE PROGRESS NOTE    Aurea Ornelas Patient Status:  Inpatient    10/10/1985 MRN [id number]   [facility] 5NW-A Attending Evens Cordoba MD   [facility] Day # 1 PCP Inge Dsouza K 3.3* 3.9 4.3   CL 97* 102 107   CO2 29.0 27.0 21.0   ALKPHO 49 48 48   AST 71* 75* 133*   ALT 68* 74* 113*   BILT 0.5 0.5 0.5   TP 8.3* 8.0 8.2       No results found for: [facility] Encounter on 04/08/21   1.  BLOOD CULTURE     Sta

## 2021-04-09 NOTE — PROGRESS NOTES
04/09/21 1100   Mobility   O2 walk?  Yes   SPO2% on Room Air at Rest 92   SPO2% on Oxygen at Rest 86   At rest oxygen flow (liters per minute) 82   SPO2% Ambulation on Oxygen 93   Ambulation oxygen flow (liters per minute) 7

## 2021-04-09 NOTE — COVID NURSING ASSESSMENT
COVID-19 Daily Discharge Readiness-Nursing    O2 Sat at Rest:  SPO2% on Room Air at Rest: 92  %   O2 Sat with Exertion: SPO2% Ambulation on Oxygen: 93  % on Ambulation oxygen flow (liters per minute): 7  liters   Temperature max from last 24 hrs: Temp (24h

## 2021-04-09 NOTE — PLAN OF CARE
Problem: Patient/Family Goals  Goal: Patient/Family Long Term Goal  Description: Patient's Long Term Goal:    Interventions:  -   - See additional Care Plan goals for specific interventions  4/8/2021 2129 by Shatna Montanez RN  Outcome: Progressing  4/8 tolerated, if ordered  - Obtain nutritional consult as needed  - Evaluate fluid balance  4/8/2021 2129 by Nicky Biswas, RN  Outcome: Progressing  4/8/2021 2129 by Nicky Biswas, RN  Outcome: Progressing  Goal: Maintains or returns to baseline bowel fu ability or social support system  4/8/2021 2129 by Bravo Whitney RN  Outcome: Progressing  4/8/2021 2129 by Bravo Whitney RN  Outcome: Progressing

## 2021-04-09 NOTE — PLAN OF CARE
Problem: Patient/Family Goals  Goal: Patient/Family Long Term Goal  Description: Patient's Long Term Goal:    Interventions:  -   - See additional Care Plan goals for specific interventions  Outcome: Progressing  Goal: Patient/Family Short Term Goal  Tarik bowel function  - Maintain adequate hydration with IV or PO as ordered and tolerated  - Evaluate effectiveness of GI medications  - Encourage mobilization and activity  - Obtain nutritional consult as needed  - Establish a toileting routine/schedule  - Con

## 2021-04-09 NOTE — COVID NURSING ASSESSMENT
COVID-19 Daily Discharge Readiness-Nursing    O2 Sat at Rest:    98 %  1L NC  O2 Sat with Exertion:   95 % on  3  liters   Temperature max from last 24 hrs: Temp (24hrs), Av.6 °F (37 °C), Min:97.6 °F (36.4 °C), Max:99.8 °F (37.7 °C)    Inflammatory Mar

## 2021-04-09 NOTE — PROGRESS NOTES
[facility] Hospitalist Progress Note     CC: Hospital Follow up    PCP: HENNA King       Assessment/Plan:     Principal Problem:    Pneumonia due to COVID-19 virus    38 y/o m w hx of carol, UC p/w sob     Acute hypoxic resp failure d/t covid   -+test 4/4 176.0         Recent Labs   Lab 04/08/21  0248 04/08/21  0552 04/09/21  0621   * 136* 138*   BUN 7 7 15   CREATSERUM 0.73 0.73 0.79   GFRAA 141 141 136   GFRNAA 122 122 118   CA 8.6 8.5 8.8   * 135* 137   K 3.3* 3.9 4.3   CL 97* 102 107   CO2

## 2021-04-09 NOTE — PROGRESS NOTES
[address] Patient Status:  Inpatient    10/10/1985 MRN [id number]   [facility] 5NW-A Attending Raisa Tobar MD   [address] Day # 1 PCP Hank [facility]     SUBJECTIVE: Pt states that SOB is improving, still dyspneic with positive BS.                         Extremity: No clubbing or cyanosis. no edema                          Skin: No rashes or lesions.         Lab Results   Component Value Date     04/09/2021    K 4.3 04/09/2021     04/09/2021    CO2 21.0 04/0

## 2021-04-10 VITALS
BODY MASS INDEX: 31.57 KG/M2 | HEIGHT: 70 IN | TEMPERATURE: 98 F | SYSTOLIC BLOOD PRESSURE: 136 MMHG | WEIGHT: 220.5 LBS | DIASTOLIC BLOOD PRESSURE: 70 MMHG | RESPIRATION RATE: 27 BRPM | OXYGEN SATURATION: 99 % | HEART RATE: 93 BPM

## 2021-04-10 PROCEDURE — 81003 URINALYSIS AUTO W/O SCOPE: CPT | Performed by: HOSPITALIST

## 2021-04-10 PROCEDURE — 87493 C DIFF AMPLIFIED PROBE: CPT | Performed by: HOSPITALIST

## 2021-04-10 PROCEDURE — 80053 COMPREHEN METABOLIC PANEL: CPT | Performed by: INTERNAL MEDICINE

## 2021-04-10 RX ORDER — BENZONATATE 100 MG/1
100 CAPSULE ORAL 3 TIMES DAILY PRN
Qty: 9 CAPSULE | Refills: 0 | Status: SHIPPED | OUTPATIENT
Start: 2021-04-10 | End: 2021-04-13

## 2021-04-10 NOTE — PROGRESS NOTES
[facility]                INFECTIOUS DISEASE PROGRESS NOTE    Aurea Ornelas Patient Status:  Inpatient    10/10/1985 MRN [id number]   [facility] 5NW-A Attending Evens Cordoba MD   [facility] Day # 2 PCP Inge Dsouza 21.0   ALKPHO 49 48 48   AST 71* 75* 133*   ALT 68* 74* 113*   BILT 0.5 0.5 0.5   TP 8.3* 8.0 8.2       No results found for: [address] Encounter on 04/08/21   1.  BLOOD CULTURE     Status: None (Preliminary result)    Collection Time:

## 2021-04-10 NOTE — PROGRESS NOTES
NURSING DISCHARGE NOTE    Discharged Home via Wheelchair. Accompanied by Support staff  Belongings Taken by patient/family. Patient discharged home with wife. All discharge instructions, medications and follow up care discussed.  Patient verbalized

## 2021-04-10 NOTE — COVID NURSING ASSESSMENT
COVID-19 Daily Discharge Readiness-Nursing    O2 Sat at Rest:  SPO2% on Room Air at Rest: 99  %   O2 Sat with Exertion: SPO2% Ambulation on Oxygen: 93  % on Ambulation oxygen flow (liters per minute): 0  liters   Temperature max from last 24 hrs: Temp (24h

## 2021-04-10 NOTE — DISCHARGE SUMMARY
General Medicine Discharge Summary     Patient ID:  Alley Francis  37year old  10/10/1985    Admit date: 4/8/2021    Discharge date and time: 4/10/21  Attending Physician: Jaun Moore MD     Consults: IP CONSULT TO HOSPITALIST  IP CONSULT TO INFECTIOUS D MG Tabs  Commonly known as: TYLENOL EXTRA STRENGTH     diphenhydrAMINE 25 MG Caps  Commonly known as: BENADRYL     * guaiFENesin 100 MG/5ML Soln  Commonly known as: ROBITUSSIN     * guaiFENesin  MG Tb12  Commonly known as: MUCINEX         * This list

## 2021-04-10 NOTE — PLAN OF CARE
Problem: Patient/Family Goals  Goal: Patient/Family Long Term Goal  Description: Patient's Long Term Goal:    Interventions:  -   - See additional Care Plan goals for specific interventions  Outcome: Progressing  Goal: Patient/Family Short Term Goal  Tarik function  Description: INTERVENTIONS:  - Assess bowel function  - Maintain adequate hydration with IV or PO as ordered and tolerated  - Evaluate effectiveness of GI medications  - Encourage mobilization and activity  - Obtain nutritional consult as needed

## 2021-04-10 NOTE — PLAN OF CARE
COVID-19 Daily Discharge Readiness-Nursing  Ax04. Telemetry. Sinus rhythm.  on room air overnight. Needs up to 7L during ambulation. Dry strong cough, prn robitussin and tessalon caps given. Denied pain. Afebrile.  Continent, r/o c.diff still need sample goals for specific interventions  Outcome: Progressing  Goal: Patient/Family Short Term Goal  Description: Patient's Short Term Goal:   4/8 noc: wean vapotherm  7/1TGCA vapotherm and remain comfortable   4/8 ([address]): Able to wean off of O2, relieve cough  4/ activity  - Obtain nutritional consult as needed  - Establish a toileting routine/schedule  - Consider collaborating with pharmacy to review patient's medication profile  Outcome: Progressing     Problem: RISK FOR INFECTION - ADULT  Goal: Absence of fever/

## 2021-04-10 NOTE — PROGRESS NOTES
[id number] Patient Status:  Inpatient    10/10/1985 MRN [id number]   [facility] 5NW-A Attending Garnet Moritz, MD   [facility] Day # 2 PCP Inge Mullen     SUBJECTIVE: Pt feels well, has maintained on RA.   Wants to go ho no edema                          Skin: No rashes or lesions.         Lab Results   Component Value Date     04/10/2021    K 3.5 04/10/2021     04/10/2021    CO2 28.0 04/10/2021    BUN 14 04/10/2021    CREATSERUM 0.83 04/10/2021     04/10

## 2021-04-11 NOTE — PAYOR COMM NOTE
--------------  DISCHARGE REVIEW    Payor: [address] #:  [id number]  Authorization Number: [id number]    Admit date: 4/8/21  Admit time:   5:00 AM  Discharge Date: 4/10/2021  1:01 PM     Admitting Physician: Errol Collins Dwana Corona MD on 4/08/2021 at 7:48 AM       XR CHEST AP PORTABLE  (CPT=71045)    Result Date: 4/8/2021  CONCLUSION:  1. Bilateral airspace opacities as detailed above consistent with COVID-19.   ED M.D. notified of these findings with preliminary radiology

## 2021-04-12 ENCOUNTER — PATIENT OUTREACH (OUTPATIENT)
Dept: CASE MANAGEMENT | Age: 36
End: 2021-04-12

## 2022-02-04 PROBLEM — E66.9 OBESITY (BMI 30-39.9): Status: ACTIVE | Noted: 2022-02-04

## 2022-02-04 PROBLEM — B08.1 MOLLUSCUM CONTAGIOSUM: Status: RESOLVED | Noted: 2018-05-30 | Resolved: 2022-02-04

## 2022-02-04 PROBLEM — U07.1 PNEUMONIA DUE TO COVID-19 VIRUS: Status: RESOLVED | Noted: 2021-04-08 | Resolved: 2022-02-04

## 2022-02-04 PROBLEM — N48.1 BALANITIS: Status: RESOLVED | Noted: 2018-05-30 | Resolved: 2022-02-04

## 2022-02-04 PROBLEM — K76.0 FATTY LIVER: Status: ACTIVE | Noted: 2022-02-04

## 2022-02-04 PROBLEM — J12.82 PNEUMONIA DUE TO COVID-19 VIRUS: Status: RESOLVED | Noted: 2021-04-08 | Resolved: 2022-02-04

## 2025-06-19 NOTE — PROGRESS NOTES
1st attempt to contact patient to assist in sched hosp [id number] already made    Pham Barrera MD PULMONARY DISEASES In 2 weeks pulmonary follow up [address]   [phone]      appt on 4/27 @11:40[place]
18-Jun-2025 12:41

## (undated) NOTE — LETTER
[place] [address] [facility], [address]       11/09/18        Patient: Alley [place]ing   YOB: 1985   Date of Visit: 11/9/2018       Dear  Dr. Kervin Dc,      Thank you for referring Gasusie Penny to my prac

## (undated) NOTE — LETTER
19      Patient: Mukesh Martinez  : 10/10/1985 Visit date: 2019    Dear Janine Diane,      I examined your patient in follow-up today. He presented in April with a nonhealing puncture wound of the left index finger from .

## (undated) NOTE — LETTER
1/18/2019          To Whom It May Concern:    Edita Hummel is currently under my medical care at this time. He may return to work on 1/23/2019  Activity is restricted as follows: none. If you require additional information please contact our office.

## (undated) NOTE — LETTER
19      Patient: Alley Francis  : 10/10/1985 Visit date: 2019    Dear Syd Posada,      I examined your patient in consultation today.     He has a nonhealing traumatic wound of the left index finger of almost 3 months duration    The